# Patient Record
Sex: MALE | Race: WHITE | Employment: FULL TIME
[De-identification: names, ages, dates, MRNs, and addresses within clinical notes are randomized per-mention and may not be internally consistent; named-entity substitution may affect disease eponyms.]

---

## 2022-12-08 ENCOUNTER — NURSE TRIAGE (OUTPATIENT)
Dept: OTHER | Facility: CLINIC | Age: 37
End: 2022-12-08

## 2022-12-08 NOTE — TELEPHONE ENCOUNTER
Location of patient: [place]    Received call from Fidel Cruz at [employer]; Patient with Red Flag Complaint requesting to establish care with new PCP at [address] Subjective: Caller states \"Saravanan is my son. He is 44years old. On his father's side there is addiction. He is a recovering alcoholic. He has relapsed. Not to the point he is getting drunk, but more than he should. He is getting the urge to go back to drinking and he wants to nip it in the bud. He is also ADD. His current physician was prescribing Adderall. With any controlled substance you have to take occasional pee tests. He went in and went to the restroom and said he needed to do a pee test. He went in Wednesday and they said you don't need to do the pee test, the doctor is no longer going to prescribe Adderall to you anymore. He has been struggling for a month and he called me last night and asked me to find a doctor. \"     Limited Triage d/t patient not caller-patient at work currently    Current Symptoms: recent relapse : drinking alcohol daily-NOT getting drunk, vomiting after eating    Hx of Alcohol abuse-recovering Alcoholic, ADD, Anxiety    Overdose x 12 years ago on pain medication and was hospitalized    Onset: 1 month ago; worsening    Associated Symptoms: reduced activity    Pain Severity: Denies    Temperature: Denies    What has been tried: Nothing    LMP: NA Pregnant: NA    Recommended disposition: See in Office Today or Tomorrow. Patient is currently unestablished. Writer advised patient to be seen at [facility]ED if unable to get new patient appointment Today or Tomorrow. Mother verbalized understanding. Care advice provided, patient verbalizes understanding; denies any other questions or concerns; instructed to call back for any new or worsening symptoms. Patient/Caller agrees with recommended disposition; writer provided warm transfer to [facility] at [employer] for appointment scheduling. Attention Provider:   Thank you for allowing me to participate in the care of your patient. The patient was connected to triage in response to information provided to the E[facility]. Please do not respond through this encounter as the response is not directed to a shared pool.     Reason for Disposition   Drinking alcohol daily and prior DTs (delirium tremens)    Protocols used: Alcohol Use and Problems-ADULT-[place]